# Patient Record
Sex: MALE | Race: WHITE
[De-identification: names, ages, dates, MRNs, and addresses within clinical notes are randomized per-mention and may not be internally consistent; named-entity substitution may affect disease eponyms.]

---

## 2018-05-18 ENCOUNTER — HOSPITAL ENCOUNTER (INPATIENT)
Dept: HOSPITAL 17 - H260 | Age: 23
LOS: 3 days | Discharge: HOME | DRG: 885 | End: 2018-05-21
Attending: PSYCHIATRY & NEUROLOGY | Admitting: PSYCHIATRY & NEUROLOGY
Payer: COMMERCIAL

## 2018-05-18 VITALS
RESPIRATION RATE: 16 BRPM | HEART RATE: 53 BPM | OXYGEN SATURATION: 98 % | SYSTOLIC BLOOD PRESSURE: 105 MMHG | DIASTOLIC BLOOD PRESSURE: 53 MMHG | TEMPERATURE: 97.8 F

## 2018-05-18 VITALS — HEIGHT: 68 IN | WEIGHT: 128.75 LBS | BODY MASS INDEX: 19.51 KG/M2

## 2018-05-18 DIAGNOSIS — F12.90: ICD-10-CM

## 2018-05-18 DIAGNOSIS — Z81.8: ICD-10-CM

## 2018-05-18 DIAGNOSIS — F17.210: ICD-10-CM

## 2018-05-18 DIAGNOSIS — F32.2: Primary | ICD-10-CM

## 2018-05-18 DIAGNOSIS — Z91.5: ICD-10-CM

## 2018-05-18 PROCEDURE — 83036 HEMOGLOBIN GLYCOSYLATED A1C: CPT

## 2018-05-18 PROCEDURE — 93005 ELECTROCARDIOGRAM TRACING: CPT

## 2018-05-18 PROCEDURE — 84443 ASSAY THYROID STIM HORMONE: CPT

## 2018-05-18 PROCEDURE — 80061 LIPID PANEL: CPT

## 2018-05-18 PROCEDURE — 80048 BASIC METABOLIC PNL TOTAL CA: CPT

## 2018-05-18 RX ADMIN — BUPROPION HYDROCHLORIDE SCH MG: 150 TABLET, FILM COATED ORAL at 12:00

## 2018-05-18 RX ADMIN — NICOTINE SCH PATCH: 21 PATCH, EXTENDED RELEASE TOPICAL at 09:00

## 2018-05-18 NOTE — HHI.HP
Provisional Diagnosis


Admission Date


May 18, 2018 at 05:35


Axis I.


Major depressive disorder





                               Certification of Person's Competence 


                           To Provide Express and Informed Consent





I have personally examined Aravind Ahmadi , a person being served at [facility] on, May 18, 2018 13:20.


Express and informed consent means consent voluntarily given in writing, by a 

competent person, after sufficient explanation and disclosure of the subject 

matter involved to enable the person to make a knowing and willful decision 

without any element of force, fraud, deceit, duress, or other form of 

constraint or coercion.





This person is 18 years of age or older, is not now known to be incompetent to 

consent to treatment with a guardian advocate, and does not have a health care 

surrogate or proxy currently making medical treatment decisions.  I have found 

this person to be one of the following:





[xxx] Competent to provide express and informed consent, as defined above, for 

voluntary admission to this facility and is competent to provide express and 

informed consent for treatment.  He/she has the consistent capacity to make 

well reasoned, willful, and knowing decisions concerning his or her medical or 

mental health treatment.  The person fully and consistently understands the 

purpose of the admission for examination/placement and is fully capable of 

personally exercising all rights [place]





[] Incompetent to provide express and informed consent to voluntary admission, 

and this is incompetent to provide express and informed consent to treatment.  

The person must be transferred to involuntary status and a petition for a 

guardian advocate filed with the Circuit Court.





[] Refusing to provide express and informed consent to voluntary admission but 

is competent to provide express and informed consent for treatment.  The person 

must be discharged or transferred to involuntary status.





Form shall be completed within 24 hours of a person's arrival at the receiving 

facility and filed in the clinical record of each person:


1. Admitted on a voluntary basis


2. Permitted to provide express and informed consent to his/her own treatment


3. Allowed to transfer from involuntary to voluntary status


4. Prior to permitting a person to consent to his or her own treatment after 

having been previously found incompetent to consent to treatment.





History of Present Illness


Capacity:  Has Capacity


HPI


Patient is a 22-year-old  man, , unemployed, with no past 

psychiatric history, no previous psychiatric admissions, no previous suicide 

attempt or self-injurious behavior, with substance use history significant for 

recreational marijuana use, no past medical history, who was transferred from 

medical Hospital after patient was brought in by EMS due to suicide attempt via 

overdose which patient was admitted to the inpatient psychiatry for further 

evaluation and management.  Patient reports that for the past couple of weeks 

he had been noticing having decreased sleep, appetite, concentration, I have 

been feeling depressed after wife and daughter have left to [place] along 

with feelings of guilt, feeling hopeless and having suicidal ideation recently.

  She denies as patient states that he has been feeling guilty of "not being 

there to support my wife"..  Patient reports having had suicide ideations on 

the day of his overdose which she took 6-8 tablets of ibuprofen in attempt to 

end his life but after ingesting the tablets have regretted doing so and has 

self-induced vomiting in contact us and about what he had done which she had 

called marijuana was brought to the hospital for further evaluation.  Patient 

states that he immediately regretted what he had done and wanted to die states 

that he is feeling very lonely and very sad due to missing his wife and 

daughter although states that he has family living near the area that he could 

have called for support but did not.  Patient states that he is feeling 

remorseful recent actions, noted to be tearful during interview states that it 

was "stupid thing to do".  Patient this time continues report feeling down but 

denying any's current suicide ideations at this time nor any homicidal ideations

, perceptual disturbances or delusions.


Family psychiatric history: Aunt with depression, no suicide in the family


Past psychiatric history: No previous psychiatric diagnoses, no prior 

psychiatric admissions, no previous suicide attempt or self-injurious behavior, 

no outpatient mental health provider.  No previous medication trials, history 

of abuse.


Substance use history: Tobacco use 1 pack per day, alcohol use once per month 

usually more than 6 beers at a time, last use was 1 month ago.  Marijuana use 3 

times a month usually one joint at a time last time being 1 week ago.  Patient 

denies use of any other drugs.  Patient denies any previous detox or 

rehabilitation programs.


Past medical history: Denies


Allergies: NKDA


Social history: , has 1 2-year-old daughter and wife are most moved 

recently to [place], is employed, no history of  service or access 

to firearms.  No legal history in the past.





Review of Systems


Except as stated in HPI:  all other systems reviewed are Neg





Past Psych History


Psychological trauma history


Denies


Violence risk - others (6 mos)


Low


Violence risk - self (6 mos)


Elevated due to recent suicide attempt





Substance Abuse History


Drugs/Alcohol past 12 months


Tobacco use 1 pack per day, alcohol use once per month usually more than 6 

beers at a time, last use was 1 month ago.  Marijuana use 3 times a month 

usually one joint at a time last time being 1 week ago.  Patient denies use of 

any other drugs.  Patient denies any previous detox or rehabilitation programs.





Past Family Social History


Coded Allergies:  


     No Known Allergies (Unverified , 5/18/18)





Current Medications








 Medications


  (Trade)  Dose


 Ordered  Sig/Mirtha


 Route  Start Time


 Stop Time Status Last Admin


 


  (Ativan)  1 mg  Q6H  PRN


 PO  5/18/18 07:45


     


 


 


  (Ativan Inj)  1 mg  Q6H  PRN


 IM  5/18/18 07:45


     


 


 


  (Benadryl)  50 mg  HS  PRN


 PO  5/18/18 07:45


     


 


 


  (Benadryl Inj)  50 mg  HS  PRN


 IM  5/18/18 07:45


     


 


 


  (Tylenol)  650 mg  Q4H  PRN


 PO  5/18/18 07:45


     


 


 


  (Milk Of


 Magnesia Liq)  30 ml  DAILY  PRN


 PO  5/18/18 07:45


     


 


 


  (Mag-Al Plus


 Susp Liq)  30 ml  Q6H  PRN


 PO  5/18/18 07:45


     


 


 


  (Habitrol 21 Mg


 Patch.24 Hr)  1 patch  DAILY


 T-DERMAL  5/18/18 09:00


     


 


 


 Miscellaneous


 Information  1  HS


 T-DERMAL  5/18/18 21:00


     


 


 


  (Wellbutrin Sr)  150 mg  DAILY


 PO  5/18/18 12:00


    5/18/18 12:00


 








Family Psych History


Aunt with depression


Social History


, has 1 2-year-old daughter and wife are most moved recently to [place], is employed, no history of  service or access to firearms.  No 

legal history in the past.


Patient's Strengths (min. 2)


Verbal and communicative





Physical Exam


Patient not noted to be in acute distress, no gross motor abnormalities, no 

tremors or EPS, no noted psychomotor retardation or agitation.


Vital Signs





Vital Signs








  Date Time  Temp Pulse Resp B/P (MAP) Pulse Ox O2 Delivery O2 Flow Rate FiO2


 


5/18/18 06:47 97.8 53 16 105/53 (70) 98   











Mental Status Examination


Appearance:  Disheveled


Consciousness:  Alert


Orientation:  Person, Place, Date/Time


Motor Activity:  Normal gait


Speech:  Unremarkable


Language:  Adequate


Fund of Knowledge:  Inadequate


Attention and Concentration:  Adequate


Memory:  Unremarkable


Mood:  Sad


Affect:  Sad, Other (Tearful)


Thought Process & Associations:  Intact, Linear


Thought Content:  Appropriate


Hallucination Type:  None


Delusion Type:  None


Suicidal Ideation:  Yes (Denies today)


Suicidal Plan:  No


Suicidal Intention:  No


Homicidal Ideation:  No


Homicidal Plan:  No


Homicidal Intention:  No


Insight:  Poor


Judgment:  Poor





Assessment & Plan


Problem List:  


(1) Major depressive disorder, single episode, severe without psychotic features


ICD Codes:  F32.2 - Major depressive disorder, single episode, severe without 

psychotic features


Assessment & Plan


Estimated LOS: 5-7 days.  Patient is a 22-year-old  man, , 

unemployed, with no past psychiatric history, no previous psychiatric admissions

, no previous suicide attempt or self-injurious behavior, with substance use 

history significant for recreational marijuana use, no past medical history, 

who was transferred from medical Hospital after patient was brought in by EMS 

due to suicide attempt via overdose which patient was admitted to the inpatient 

psychiatry for further evaluation and management.  Patient with significant 

depressive symptoms along with recent suicide attempt which patient requires 

inpatient psychiatric stabilization and safety.  We will start patient on 

bupropion  mg p.o. daily for depression, we will continue monitor mood and 

behavior.  Patient will be admitted under voluntary status.  Collateral 

formation pending (Lluvia Ahmadi - aunt  [phone]).  Social work 

intervention for psychosocial assessment.  Discharge planning in progress.


Discharge Planning


Back to his residence once psychiatrically clear











Israel Clayton MD May 18, 2018 13:40

## 2018-05-19 VITALS
TEMPERATURE: 97.4 F | DIASTOLIC BLOOD PRESSURE: 71 MMHG | SYSTOLIC BLOOD PRESSURE: 116 MMHG | RESPIRATION RATE: 18 BRPM | OXYGEN SATURATION: 97 % | HEART RATE: 89 BPM

## 2018-05-19 VITALS
SYSTOLIC BLOOD PRESSURE: 108 MMHG | OXYGEN SATURATION: 99 % | RESPIRATION RATE: 16 BRPM | DIASTOLIC BLOOD PRESSURE: 61 MMHG | HEART RATE: 58 BPM | TEMPERATURE: 97.8 F

## 2018-05-19 LAB
BUN SERPL-MCNC: 11 MG/DL (ref 7–18)
CALCIUM SERPL-MCNC: 8.9 MG/DL (ref 8.5–10.1)
CHLORIDE SERPL-SCNC: 102 MEQ/L (ref 98–107)
CHOLEST SERPL-MCNC: 117 MG/DL (ref 120–200)
CHOLESTEROL/ HDL RATIO: 2.47 RATIO
CREAT SERPL-MCNC: 1.16 MG/DL (ref 0.6–1.3)
GFR SERPLBLD BASED ON 1.73 SQ M-ARVRAT: 77 ML/MIN (ref 89–?)
GLUCOSE SERPL-MCNC: 106 MG/DL (ref 74–106)
HBA1C MFR BLD: 5.3 % (ref 4.3–6)
HCO3 BLD-SCNC: 32.2 MEQ/L (ref 21–32)
HDLC SERPL-MCNC: 47.2 MG/DL (ref 40–60)
LDLC SERPL-MCNC: 62 MG/DL (ref 0–99)
SODIUM SERPL-SCNC: 140 MEQ/L (ref 136–145)
TRIGL SERPL-MCNC: 41 MG/DL (ref 42–150)

## 2018-05-19 RX ADMIN — NICOTINE SCH PATCH: 21 PATCH, EXTENDED RELEASE TOPICAL at 09:00

## 2018-05-19 RX ADMIN — BUPROPION HYDROCHLORIDE SCH MG: 150 TABLET, FILM COATED ORAL at 08:34

## 2018-05-19 NOTE — HHI.PYPN
Subjective


Remarks


Pt seen and discussed with staff.  He was admitted yesterday due to suicide 

attempt via OD on ibuprofen.  Pt states that he slept well last night. He has 

been compliant with medications and attending groups, but engages in limited 

socialization in milieu.  Denies SI/HI today. He victor manuel medication side effects.





Mental Status Examination


Appearance:  Disheveled


Consciousness:  Alert


Orientation:  Person, Place, Date/Time


Motor Activity:  Normal gait


Speech:  Unremarkable


Language:  Adequate


Fund of Knowledge:  Inadequate


Attention and Concentration:  Adequate


Memory:  Unremarkable


Mood:  Sad, Other (depressed)


Affect:  Sad, Blunt


Thought Process & Associations:  Intact, Linear


Thought Content:  Appropriate


Hallucination Type:  None


Delusion Type:  None


Suicidal Ideation:  No (denies today)


Suicidal Plan:  No


Suicidal Intention:  No


Homicidal Ideation:  No


Homicidal Plan:  No


Homicidal Intention:  No


Insight:  Poor


Judgment:  Poor





Results


Labs











Test


  5/19/18


09:34


 


Blood Urea Nitrogen 11 MG/DL 


 


Creatinine 1.16 MG/DL 


 


Random Glucose 106 MG/DL 


 


Calcium Level 8.9 MG/DL 


 


Sodium Level 140 MEQ/L 


 


Potassium Level 3.7 MEQ/L 


 


Chloride Level 102 MEQ/L 


 


Carbon Dioxide Level 32.2 MEQ/L 


 


Anion Gap 6 MEQ/L 


 


Estimat Glomerular Filtration


Rate 77 ML/MIN 


 


 


Hemoglobin A1c 5.3 % 


 


Triglycerides Level 41 MG/DL 


 


Cholesterol Level 117 MG/DL 


 


LDL Cholesterol 62 MG/DL 


 


HDL Cholesterol 47.2 MG/DL 


 


Cholesterol/HDL Ratio 2.47 RATIO 


 


Thyroid Stimulating Hormone


3rd Gen 1.180 uIU/ML 


 








Vitals/IOs





Vital Signs








  Date Time  Temp Pulse Resp B/P (MAP) Pulse Ox O2 Delivery O2 Flow Rate FiO2


 


5/19/18 04:48 97.8 58 16 108/61 (77) 99   











Assessment & Plan


Problem List:  


(1) Major depressive disorder, single episode, severe without psychotic features


ICD Codes:  F32.2 - Major depressive disorder, single episode, severe without 

psychotic features


Assessment & Plan


Continue current tx plan. Estimated LOS:  days


Justification for Cont. Inpt.


impairments in safety











Ayah Castellanos MD May 19, 2018 15:10

## 2018-05-19 NOTE — EKG
Date Performed: 05/19/2018       Time Performed: 10:14:32

 

PTAGE:      22 years

 

EKG:      Sinus rhythm 

 

 WITH FIRST DEGREE AV BLOCK WITH OCCASIONAL VENTRICULAR PREMATURE COMPLEXES ABNORMAL ECG 

 

NO PREVIOUS TRACING            

 

DOCTOR:   Leonardo Torres  Interpretating Date/Time  05/19/2018 11:15:38

## 2018-05-20 VITALS
RESPIRATION RATE: 16 BRPM | SYSTOLIC BLOOD PRESSURE: 117 MMHG | TEMPERATURE: 98.5 F | DIASTOLIC BLOOD PRESSURE: 66 MMHG | OXYGEN SATURATION: 100 % | HEART RATE: 55 BPM

## 2018-05-20 VITALS
RESPIRATION RATE: 16 BRPM | OXYGEN SATURATION: 98 % | SYSTOLIC BLOOD PRESSURE: 107 MMHG | DIASTOLIC BLOOD PRESSURE: 64 MMHG | HEART RATE: 70 BPM | TEMPERATURE: 97.7 F

## 2018-05-20 RX ADMIN — BUPROPION HYDROCHLORIDE SCH MG: 150 TABLET, FILM COATED ORAL at 08:39

## 2018-05-20 RX ADMIN — NICOTINE SCH PATCH: 21 PATCH, EXTENDED RELEASE TOPICAL at 09:00

## 2018-05-20 NOTE — HHI.PYPN
Subjective


Remarks


Pt seen and discussed with staff. He has been out of room and participating in 

groups today. He has been compliatn with medications and denies kev eeffects. 

no SI/HI





Mental Status Examination


Appearance:  Disheveled


Consciousness:  Alert


Orientation:  Person, Place, Date/Time


Motor Activity:  Normal gait


Speech:  Unremarkable


Language:  Adequate


Fund of Knowledge:  Inadequate


Attention and Concentration:  Adequate


Memory:  Unremarkable


Mood:  Sad, Other (depressed)


Affect:  Sad, Blunt


Thought Process & Associations:  Intact, Linear


Thought Content:  Appropriate


Hallucination Type:  None


Delusion Type:  None


Suicidal Ideation:  No (denies today)


Suicidal Plan:  No


Suicidal Intention:  No


Homicidal Ideation:  No


Homicidal Plan:  No


Homicidal Intention:  No


Insight:  Poor


Judgment:  Poor





Results


Vitals/IOs





Vital Signs








  Date Time  Temp Pulse Resp B/P (MAP) Pulse Ox O2 Delivery O2 Flow Rate FiO2


 


5/20/18 05:53 97.7 70 16 107/64 (78) 98   











Assessment & Plan


Problem List:  


(1) Major depressive disorder, single episode, severe without psychotic features


ICD Codes:  F32.2 - Major depressive disorder, single episode, severe without 

psychotic features


Assessment & Plan


Continue current tx plan.Estimated LOS:  days


Justification for Cont. Inpt.


monitoring for safety given recent suicide attempt











Ayah Castellanos MD May 20, 2018 13:21

## 2018-05-21 VITALS
DIASTOLIC BLOOD PRESSURE: 55 MMHG | SYSTOLIC BLOOD PRESSURE: 111 MMHG | OXYGEN SATURATION: 100 % | RESPIRATION RATE: 16 BRPM | HEART RATE: 50 BPM | TEMPERATURE: 98.2 F

## 2018-05-21 RX ADMIN — NICOTINE SCH PATCH: 21 PATCH, EXTENDED RELEASE TOPICAL at 09:00

## 2018-05-21 RX ADMIN — BUPROPION HYDROCHLORIDE SCH MG: 150 TABLET, FILM COATED ORAL at 09:32

## 2018-05-21 NOTE — PD.TTN
Patient Problems


1. Discharge planning


2. Medication compliance


3. Knowledge deficit


4. Lack of coping skills





Progress Toward Goals


Provider Present:  Dr. VIKKI Clayton


Provider Input:  


Dr. Marcial's treatment team met to discuss patient's treatment plan,


discharge, and medication. Patient is doing well and will discharged


today.


Nurse(s) Input:  


Patient's nurse reports patient has some insight. Patient is  medication


compliant, no behavioral problem on unit.


Psych Therapist Input:  


Patient is doing well. Patient is being discharged today. Patient denies


suicidal and homicidal ideation, Patient's speech is clear and organized.


Group Spec/RT/OT/CESAR Present:  ARSENIO Lynn


Group Spec/RT/OT/CESAR Input:  


Patient does not attend any groups











Clau Mcnair [facility] May 21, 2018 10:27

## 2019-02-25 NOTE — HHI.DS
Psychiatry Discharge Summary


Inpatient Psychiatric care?:  Yes


Advance Directive:  No


Mental Health AdvanceDirective:  No


Health Care Proxy:  No


Admission


Admission Date


May 18, 2018 at 05:35


Admission Diagnosis:  


(1) Major depressive disorder, single episode, severe without psychotic features


ICD Code:  F32.2 - Major depressive disorder, single episode, severe without 

psychotic features


Brief History


Patient is a 22-year-old  man, , unemployed, with no past 

psychiatric history, no previous psychiatric admissions, no previous suicide 

attempt or self-injurious behavior, with substance use history significant for 

recreational marijuana use, no past medical history, who was transferred from 

medical Hospital after patient was brought in by EMS due to suicide attempt via 

overdose which patient was admitted to the inpatient psychiatry for further 

evaluation and management.  Patient reports that for the past couple of weeks 

he had been noticing having decreased sleep, appetite, concentration, I have 

been feeling depressed after wife and daughter have left to [place] along 

with feelings of guilt, feeling hopeless and having suicidal ideation recently.

  She denies as patient states that he has been feeling guilty of "not being 

there to support my wife"..  Patient reports having had suicide ideations on 

the day of his overdose which she took 6-8 tablets of ibuprofen in attempt to 

end his life but after ingesting the tablets have regretted doing so and has 

self-induced vomiting in contact us and about what he had done which she had 

called marijuana was brought to the hospital for further evaluation.  Patient 

states that he immediately regretted what he had done and wanted to die states 

that he is feeling very lonely and very sad due to missing his wife and 

daughter although states that he has family living near the area that he could 

have called for support but did not.  Patient states that he is feeling 

remorseful recent actions, noted to be tearful during interview states that it 

was "stupid thing to do".  Patient this time continues report feeling down but 

denying any's current suicide ideations at this time nor any homicidal ideations

, perceptual disturbances or delusions.


Family psychiatric history: Aunt with depression, no suicide in the family


Past psychiatric history: No previous psychiatric diagnoses, no prior 

psychiatric admissions, no previous suicide attempt or self-injurious behavior, 

no outpatient mental health provider.  No previous medication trials, history 

of abuse.


Substance use history: Tobacco use 1 pack per day, alcohol use once per month 

usually more than 6 beers at a time, last use was 1 month ago.  Marijuana use 3 

times a month usually one joint at a time last time being 1 week ago.  Patient 

denies use of any other drugs.  Patient denies any previous detox or 

rehabilitation programs.


Past medical history: Denies


Allergies: NKDA


Social history: , has 1 2-year-old daughter and wife are most moved 

recently to [place], is employed, no history of  service or access 

to firearms.  No legal history in the past.


Tobacco Use In Past 30 Days:  4 or Less Cigarettes/Day


Alcohol Use:  Never


Hospital Course


Patient is a 22-year-old  man, , unemployed, with no past 

psychiatric history, no previous psychiatric admissions, no previous suicide 

attempt or self-injurious behavior, with substance use history significant for 

recreational marijuana use, no past medical history, who was transferred from 

medical Hospital after patient was brought in by EMS due to suicide attempt via 

overdose which patient was admitted to the inpatient psychiatry for further 

evaluation and management.  Patient was started on buproprion XL  150mg PO 

daily which he tolerated well with no adverse drug reactions.  Patient 

initially with depressed mood but symptoms were lessening with treatment, 

participation in groups and with mileu of the unit.  Patient continued to 

maintain stable mood, with no manic, depressive or psychotic symptoms noted.  

He was calm and cooperative with staff, compliant with medications and had no 

behavioral disturbances since admission.   Upon discharge patient stated 

feeling good, stated feeling okay with returning back home with aunt involved 

for support; noted to be calm and cooperative and stated that he would be 

willing to continue treatment and follow-up.  He agreed to continuing medical 

recommendations, treatment and cooperate for continuity of care.  Patient; 

denies SI, HI, AVH or delusions.  Supportive psychotherapy provided.   Patient 

advised to call 911 or go nearest ED in case of emergency.  Patient agreed with 

plan.





Results


Blood Pressure


111 / 55





Vital Signs








  Date Time  Temp Pulse Resp B/P (MAP) Pulse Ox O2 Delivery O2 Flow Rate FiO2


 


5/21/18 05:25 98.2 50 16 111/55 (73) 100   











Laboratory Tests








Test


  5/19/18


09:34


 


Carbon Dioxide Level


  32.2 MEQ/L


(21.0-32.0)


 


Estimat Glomerular Filtration


Rate 77 ML/MIN


(>89)


 


Triglycerides Level


  41 MG/DL


()


 


Cholesterol Level


  117 MG/DL


(120-200)








 Laboratory Results








Test


  5/19/18


09:34


 


Cholesterol Level


  117 MG/DL


(120-200)


 


HDL Cholesterol


  47.2 MG/DL


(40.0-60.0)


 


Hemoglobin A1c


  5.3 %


(4.3-6.0)


 


LDL Cholesterol


  62 MG/DL


(0-99)


 


Triglycerides Level


  41 MG/DL


()








Summary of Procedures


None


Pending results at discharge:  No





Medications


# of Antipsychotic meds at D/C:  0


Approp Antipsych med options


1 - Minimum of three failed multiple trials of monotherapy.


2 - Documented plan to taper to monotherapy due to previous use of multiple 

meds OR cross-taper in progress at D/C.


3 - Documentation of augmentation of Clozapine.


4 - Justification other than those listed in allowable values 1-3, document here

:





Discharge


Discharge Date:  May 21, 2018


Discharge Diagnosis:  


(1) Major depressive disorder, single episode, severe without psychotic features


ICD Code:  F32.2 - Major depressive disorder, single episode, severe without 

psychotic features


Pt Condition on Discharge:  Stable


Discharge Disposition:  Discharge Home





Discharge Instructions


Diet Instructions:  As Tolerated, No Restrictions


Activities you can perform:  Regular-No Restrictions


Scheduled Appointment:  [facility]





Discharge Time


> 30 minutes





Mental Status Examination


Appearance:  Appropriate


Consciousness:  Alert


Orientation:  Person, Place, Date/Time


Motor Activity:  Normal gait


Speech:  Unremarkable


Language:  Adequate


Fund of Knowledge:  Inadequate


Attention and Concentration:  Adequate


Memory:  Unremarkable


Mood:  Appropriate


Affect:  Appropriate, Blunt


Thought Process & Associations:  Intact, Goal directed, Linear


Thought Content:  Appropriate


Hallucination Type:  None


Delusion Type:  None


Suicidal Ideation:  No


Suicidal Plan:  No


Suicidal Intention:  No


Homicidal Ideation:  No


Homicidal Plan:  No


Homicidal Intention:  No


Insight:  Adequate


Judgment:  Adequate





Discharge/Advance Care Plan


Health Problems:  


(1) Major depressive disorder, single episode, severe without psychotic features


Goals to promote your health


* To prevent worsening of your condition and complications


* To maintain your health at the optimal level


Directions to meet your goals


*** Take your medications as prescribed


***  Follow your dietary instruction


***  Follow activity as directed





***  Keep your appointments as scheduled


***  Take your immunizations and boosters as scheduled


***  If your symptoms worsen call your PCP, if no PCP go to Urgent Care Center 

or Emergency Room ***


***  For 24/7 questions related to your inpatient stay or results of tests 

pending at discharge, please contact Dr. Israel Clayton at [phone]


***  Smoking is Dangerous to Your Health. Avoid second hand smoking ***











Israel Clayton MD May 21, 2018 17:21 normal